# Patient Record
Sex: MALE | Race: WHITE | NOT HISPANIC OR LATINO
[De-identification: names, ages, dates, MRNs, and addresses within clinical notes are randomized per-mention and may not be internally consistent; named-entity substitution may affect disease eponyms.]

---
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
## 2020-10-23 ENCOUNTER — AMBULATORY - HEALTHEAST (OUTPATIENT)
Dept: CARDIOLOGY | Facility: CLINIC | Age: 20
End: 2020-10-23

## 2020-10-23 ENCOUNTER — RECORDS - HEALTHEAST (OUTPATIENT)
Dept: ADMINISTRATIVE | Facility: OTHER | Age: 20
End: 2020-10-23

## 2020-10-26 ENCOUNTER — OFFICE VISIT - HEALTHEAST (OUTPATIENT)
Dept: CARDIOLOGY | Facility: CLINIC | Age: 20
End: 2020-10-26

## 2020-10-26 DIAGNOSIS — R53.82 CHRONIC FATIGUE: ICD-10-CM

## 2020-10-26 ASSESSMENT — MIFFLIN-ST. JEOR: SCORE: 1587.32

## 2021-06-05 VITALS
HEART RATE: 74 BPM | DIASTOLIC BLOOD PRESSURE: 68 MMHG | OXYGEN SATURATION: 99 % | WEIGHT: 127 LBS | HEIGHT: 70 IN | SYSTOLIC BLOOD PRESSURE: 102 MMHG | BODY MASS INDEX: 18.18 KG/M2

## 2021-06-12 NOTE — PATIENT INSTRUCTIONS - HE
Zeferino Mendosa    It was a pleasure to see you today for a clinic visit.    There is no evidence for postural orthostasis and no evidence to suggest underlying current clinical arrhythmia.  Resume activities without restriction  Call if symptoms of fainting near fainting or symptoms associated with rapid heart beating.  A cardiac event monitor could be considered  Follow up appointment:   Dr. Patiño if needed    Contact Tory at [phone] with questions or concerns.    Casey Patiño MD

## 2021-06-16 PROBLEM — R53.82 CHRONIC FATIGUE: Status: ACTIVE | Noted: 2020-10-26

## 2021-06-29 NOTE — PROGRESS NOTES
Progress Notes by Casey Patiño MD at 10/26/2020  2:10 PM     Author: Casey Patiño MD Service: -- Author Type: Physician    Filed: 10/26/2020  3:01 PM Encounter Date: 10/26/2020 Status: Signed    : Casey Patiño MD (Physician)                 ELECTROPHYSIOLOGY CONSULTATION    Thank you, Dr. Arambula, for asking the [facility] team to see Mr. Zeferino Mendosa to evaluate for suspected POTS.      Assessment/Recommendations   Clinic Problem List:  1. Chronic fatigue         Assessment:    Chronic fatigue nausea and vomiting possibly associated with a chronic Lyme syndrome.  No clinical features to suggest symptoms of orthostatic intolerance or cardiac arrhythmia.  Symptoms have improved markedly under current therapy.    Plan:  There is no evidence for postural orthostasis and no evidence to suggest underlying current clinical arrhythmia.  Resume activities without restriction  Call if symptoms of fainting near fainting or symptoms associated with rapid heart beating.  A cardiac event monitor could be considered  Follow up appointment:   Dr. Patiño if needed       History of Present Illness     Zeferino Mendosa is a 20 y.o. male with chief complaint primarily of fatigue and excessive nausea and vomiting.  In 2015 he had non-Hodgkin's lymphoma with a cervical mass resected.  It is my understanding that no chemotherapy or radiation therapy was required and he has had no evidence for recurrence since that time.  In approximately 2017 he noted severe symptoms of fatigue generally in the middle or the end of the day such that he would come home from school and go to bed.  He also had weight loss nausea and vomiting.  Work-up for recurrent lymphoma was apparently negative.  He apparently had sinus tachycardia with heart rates greater than 150 bpm associated with episodes of nausea and vomiting in February 2020.  No diagnosis of arrhythmia was made and heart rates gradually came down.  More recently  he was diagnosed as having chronic Lyme disease based on a positive Lyme titer and has been treated by Dr. Resendiz herbal therapies,cefdinar, Plaquenil and Minocin.   He has noted rather marked improvement with less fatigue, good overall exercise tolerance and resolution of nausea and vomiting.    He specifically denies symptoms of orthostatic lightheadedness presyncope or syncope.  He denies being bothered by palpitations or sense of rapid heart beating.  In early August his heart rate increased from approximately 91 to approximately 127 after standing for several minutes with blood pressure dropped from approximately 110 systolic to approximately 95 systolic without apparent symptoms.  These measurements were done on 1 occasion at the request of Dr. Resendiz.  Patient has had a history of bronchitis and possibly asthma and also history of anxiety.    Personnaly reviewed: Twelve-lead electrocardiogram from September 2020 shows normal sinus rhythm rate 60 bpm normal ME QRS and QT intervals.         Physical Examination Review of Systems   Vitals:    10/26/20 1411   BP: 102/68   Pulse: 74   SpO2: 99%     Body mass index is 18.22 kg/m .  Wt Readings from Last 3 Encounters:   10/26/20 127 lb (57.6 kg)   Supine blood pressure 100/71 pulse 61  Standing blood pressure 102/68 with pulse of 99 and no symptoms     Appearance:   no distress, somewhat thin   HEENT: no scleral icterus, normal conjunctivae    Neck: no carotid bruits or thyromegaly   Chest/Lungs:   lungs are clear to auscultation, no rales or wheezing,      Cardiovascular:   Jugular venous pressure 4 cm, Apical pulse is quite regular at 64 bpm. Normal S1,S2 with no murmurs or gallops,   Abdomen:  no  Hepatosplenomegaly., nontender,  bowel sounds are present   Extremities: no cyanosis or clubbing, No edema   Skin: no xanthelasma, warm.    Neurologic: No gross focal neurologic deficits   Mood/Affect: Alert, cooperative    General: WNL  Eyes: WNL  Ears/Nose/Throat:  WNL  Lungs: WNL  Heart: Irregular Heartbeat  Stomach: WNL  Bladder: WNL  Muscle/Joints: WNL  Skin: WNL  Nervous System: WNL  Mental Health: WNL     Blood: WNL     Medical History  Surgical History Family History Social History   No past medical history on file. No past surgical history on file. No family history on file.   No family history of heart disease Social History     Socioeconomic History   ? Marital status: Single     Spouse name: Not on file   ? Number of children: Not on file   ? Years of education: Not on file   ? Highest education level: Not on file   Occupational History   ? Not on file   Social Needs   ? Financial resource strain: Not on file   ? Food insecurity     Worry: Not on file     Inability: Not on file   ? Transportation needs     Medical: Not on file     Non-medical: Not on file   Tobacco Use   ? Smoking status: Never Smoker   ? Smokeless tobacco: Never Used   Substance and Sexual Activity   ? Alcohol use: Never     Frequency: Never     Binge frequency: Never   ? Drug use: Never   ? Sexual activity: Not on file   Lifestyle   ? Physical activity     Days per week: Not on file     Minutes per session: Not on file   ? Stress: Not on file   Relationships   ? Social connections     Talks on phone: Not on file     Gets together: Not on file     Attends [employer] service: Not on file     Active member of club or organization: Not on file     Attends meetings of clubs or organizations: Not on file     Relationship status: Not on file   ? Intimate partner violence     Fear of current or ex partner: Not on file     Emotionally abused: Not on file     Physically abused: Not on file     Forced sexual activity: Not on file   Other Topics Concern   ? Not on file   Social History Narrative   ? Not on file          Medications  Allergies   Current Outpatient Medications   Medication Sig Dispense Refill   ? albuterol (PROAIR HFA;PROVENTIL HFA;VENTOLIN HFA) 90 mcg/actuation inhaler Inhale 2 puffs 2 (two)  times a day as needed.     ? albuterol (PROVENTIL) 2.5 mg /3 mL (0.083 %) nebulizer solution Inhale 2.5 mg 2 (two) times a day as needed.     ? beclomethasone (QVAR) 80 mcg/actuation HFAB inhaler      ? cefdinir (OMNICEF) 300 MG capsule Take 300 mg by mouth 2 (two) times a day.     ? FLUoxetine (PROZAC) 20 MG capsule Take 20 mg by mouth every morning.     ? hydrOXYchloroQUINE (PLAQUENIL) 200 mg tablet Take 200 mg by mouth 2 (two) times a day.     ? hydrOXYzine HCL (ATARAX) 50 MG tablet      ? melatonin 5 mg Tab tablet Take 5 mg by mouth daily.     ? minocycline (MINOCIN,DYNACIN) 100 MG capsule Take 100 mg by mouth 2 (two) times a day.       No current facility-administered medications for this visit.       Allergies   Allergen Reactions   ? Morphine Anaphylaxis and Itching     Other reaction(s): Edema, Lip swelling  Other reaction(s): Lip swelling  Other reaction(s): Lip swelling     ? Codeine Itching and Other (See Comments)     Other reaction(s): Edema  Upset stomach